# Patient Record
Sex: FEMALE | URBAN - METROPOLITAN AREA
[De-identification: names, ages, dates, MRNs, and addresses within clinical notes are randomized per-mention and may not be internally consistent; named-entity substitution may affect disease eponyms.]

---

## 2021-04-21 ENCOUNTER — WEB ENCOUNTER (OUTPATIENT)
Dept: URBAN - METROPOLITAN AREA CLINIC 72 | Facility: CLINIC | Age: 56
End: 2021-04-21

## 2021-04-21 ENCOUNTER — OFFICE VISIT (OUTPATIENT)
Dept: URBAN - METROPOLITAN AREA CLINIC 72 | Facility: CLINIC | Age: 56
End: 2021-04-21
Payer: COMMERCIAL

## 2021-04-21 ENCOUNTER — DASHBOARD ENCOUNTERS (OUTPATIENT)
Age: 56
End: 2021-04-21

## 2021-04-21 VITALS
TEMPERATURE: 98.2 F | SYSTOLIC BLOOD PRESSURE: 163 MMHG | HEART RATE: 81 BPM | HEIGHT: 66 IN | BODY MASS INDEX: 30.28 KG/M2 | RESPIRATION RATE: 18 BRPM | DIASTOLIC BLOOD PRESSURE: 91 MMHG | WEIGHT: 188.4 LBS

## 2021-04-21 DIAGNOSIS — Z79.1 NSAID LONG-TERM USE: ICD-10-CM

## 2021-04-21 DIAGNOSIS — R07.89 ATYPICAL CHEST PAIN: ICD-10-CM

## 2021-04-21 DIAGNOSIS — R19.5 DARK STOOLS: ICD-10-CM

## 2021-04-21 PROCEDURE — 99204 OFFICE O/P NEW MOD 45 MIN: CPT | Performed by: INTERNAL MEDICINE

## 2021-04-21 RX ORDER — LEVOTHYROXINE SODIUM 0.05 MG/1
1 TABLET IN THE MORNING ON AN EMPTY STOMACH TABLET ORAL ONCE A DAY
Status: ACTIVE | COMMUNITY

## 2021-04-21 RX ORDER — HYDROCHLOROTHIAZIDE 25 MG/1
1 TABLET IN THE MORNING TABLET ORAL ONCE A DAY
Status: ACTIVE | COMMUNITY

## 2021-04-21 RX ORDER — ESTRADIOL 1 MG/1
1 TABLET TABLET ORAL ONCE A DAY
Status: ACTIVE | COMMUNITY

## 2021-04-21 NOTE — HPI-TODAY'S VISIT:
Ms Seth is a 56-year-old female who presents as a new patient, she was seen in the emergency department on 4/8/2020 with right-sided abdominal pain.  Her past medical history is notable for hypothyroidism and hypertension. she went to the emergency room with a few days' worth of right-sided abdominal pain but also may radiate to her neck and she was concerned she might be having heart attack.  Her workup there was unremarkable.  She does note that she takes up to 16 ibuprofen a day due to chronic back pain.  She was having significant back pain leading up to this event and was utilizing significant ibuprofen.  In addition she reports that when this pain started her stool became dark and almost black.  She denies any weight loss.  Denies prior blood per stool.  He does endorse belching and heartburn.  No dysphagia.  she reports she was diagnosed with ulcerative colitis in 1993 but is never been on any medication and denies any bloody stool.  She also has history of diagnosed with irritable bowel syndrome.  Her last colonoscopy was greater than 10 years ago.  Ultrasound performed in the ER the right upper quadrant was normal Lab work: WBC 6.8, hemoglobin 11.5, platelet count 275, CMP normal, lipase normal

## 2021-04-28 ENCOUNTER — LAB OUTSIDE AN ENCOUNTER (OUTPATIENT)
Dept: URBAN - METROPOLITAN AREA CLINIC 72 | Facility: CLINIC | Age: 56
End: 2021-04-28

## 2021-04-30 ENCOUNTER — TELEPHONE ENCOUNTER (OUTPATIENT)
Dept: URBAN - METROPOLITAN AREA CLINIC 113 | Facility: CLINIC | Age: 56
End: 2021-04-30

## 2021-05-12 ENCOUNTER — OFFICE VISIT (OUTPATIENT)
Dept: URBAN - METROPOLITAN AREA MEDICAL CENTER 40 | Facility: MEDICAL CENTER | Age: 56
End: 2021-05-12
Payer: COMMERCIAL

## 2021-05-12 DIAGNOSIS — R19.5 ABNORMAL FECES: ICD-10-CM

## 2021-05-12 PROCEDURE — 992 APS NON BILLABLE: Performed by: INTERNAL MEDICINE

## 2021-05-26 ENCOUNTER — OFFICE VISIT (OUTPATIENT)
Dept: URBAN - METROPOLITAN AREA CLINIC 72 | Facility: CLINIC | Age: 56
End: 2021-05-26

## 2021-05-26 RX ORDER — HYDROCHLOROTHIAZIDE 25 MG/1
1 TABLET IN THE MORNING TABLET ORAL ONCE A DAY
Status: ACTIVE | COMMUNITY

## 2021-05-26 RX ORDER — LEVOTHYROXINE SODIUM 0.05 MG/1
1 TABLET IN THE MORNING ON AN EMPTY STOMACH TABLET ORAL ONCE A DAY
Status: ACTIVE | COMMUNITY

## 2021-05-26 RX ORDER — ESTRADIOL 1 MG/1
1 TABLET TABLET ORAL ONCE A DAY
Status: ACTIVE | COMMUNITY

## 2021-05-26 NOTE — HPI-TODAY'S VISIT:
Ms. Seth returns for follow-up.  She is a 56-year-old female last seen 4/21/2021.  She was a new patient presenting for ER follow-up due to right-sided abdominal pain.  She been seen in the ER earlier in April with right-sided abdominal pain that would radiate neck, had a few having a heart attack she was evaluated reported that her workup there was unremarkable.  She was using high-dose ibuprofen due to chronic back pain, in addition she felt like her stool was black dark and tarry but had no weight loss.  Her hemoglobin in the ER was 11.5.  Ultrasound of the ER was unremarkable. Based on her symptoms and her NSAID use we wanted to proceed with upper endoscopy to evaluate for ulcer disease or other causes of her symptoms.  It was noted that she is also due for screening colonoscopy but we decided to arrange this at a later date.  Unfortunately she never had her upper endoscopy.